# Patient Record
Sex: FEMALE | URBAN - METROPOLITAN AREA
[De-identification: names, ages, dates, MRNs, and addresses within clinical notes are randomized per-mention and may not be internally consistent; named-entity substitution may affect disease eponyms.]

---

## 2022-10-12 ENCOUNTER — APPOINTMENT (RX ONLY)
Dept: URBAN - METROPOLITAN AREA CLINIC 59 | Facility: CLINIC | Age: 47
Setting detail: DERMATOLOGY
End: 2022-10-12

## 2022-10-12 DIAGNOSIS — L40.0 PSORIASIS VULGARIS: ICD-10-CM

## 2022-10-12 PROCEDURE — ? PRESCRIPTION

## 2022-10-12 PROCEDURE — 99203 OFFICE O/P NEW LOW 30 MIN: CPT

## 2022-10-12 PROCEDURE — ? COUNSELING

## 2022-10-12 PROCEDURE — ? TREATMENT REGIMEN

## 2022-10-12 RX ORDER — CLOBETASOL PROPIONATE 0.5 MG/G
CREAM TOPICAL BID
Qty: 60 | Refills: 0 | Status: ERX | COMMUNITY
Start: 2022-10-12

## 2022-10-12 RX ORDER — CALCIPOTRIENE 0.05 MG/G
CREAM TOPICAL
Qty: 120 | Refills: 0 | Status: ERX

## 2022-10-12 RX ORDER — CLOBETASOL PROPIONATE 0.5 MG/G
CREAM TOPICAL BID
Qty: 60 | Refills: 0 | Status: ERX

## 2022-10-12 RX ORDER — CALCIPOTRIENE 0.05 MG/G
CREAM TOPICAL
Qty: 120 | Refills: 0 | Status: ERX | COMMUNITY
Start: 2022-10-12

## 2022-10-12 RX ADMIN — CALCIPOTRIENE: 0.05 CREAM TOPICAL at 00:00

## 2022-10-12 RX ADMIN — CLOBETASOL PROPIONATE: 0.5 CREAM TOPICAL at 00:00

## 2022-10-12 ASSESSMENT — BSA PSORIASIS: % BODY COVERED IN PSORIASIS: 20

## 2022-10-12 NOTE — PROCEDURE: TREATMENT REGIMEN
Action 1: Start
Action 3: Continue
Treatment 1: Clobetasol cream
Treatment 2: Calcipotriene cream
Sig For Treatment 1 (If Needed): twice daily
Detail Level: Zone

## 2022-10-24 ENCOUNTER — APPOINTMENT (RX ONLY)
Dept: URBAN - METROPOLITAN AREA CLINIC 59 | Facility: CLINIC | Age: 47
Setting detail: DERMATOLOGY
End: 2022-10-24

## 2022-10-24 ENCOUNTER — RX ONLY (OUTPATIENT)
Age: 47
Setting detail: RX ONLY
End: 2022-10-24

## 2022-10-24 DIAGNOSIS — L40.0 PSORIASIS VULGARIS: ICD-10-CM | Status: IMPROVED

## 2022-10-24 PROCEDURE — ? COUNSELING

## 2022-10-24 PROCEDURE — 99213 OFFICE O/P EST LOW 20 MIN: CPT

## 2022-10-24 PROCEDURE — ? TREATMENT REGIMEN

## 2022-10-24 RX ORDER — CALCIPOTRIENE 0.05 MG/G
CREAM TOPICAL
Qty: 120 | Refills: 0 | Status: ERX

## 2022-10-24 RX ORDER — CLOBETASOL PROPIONATE 0.5 MG/G
CREAM TOPICAL BID
Qty: 60 | Refills: 0 | Status: ERX

## 2022-10-24 ASSESSMENT — PGA PSORIASIS: PGA PSORIASIS 2020: MILD

## 2022-10-24 ASSESSMENT — BSA PSORIASIS: % BODY COVERED IN PSORIASIS: 20

## 2022-10-24 NOTE — PROCEDURE: TREATMENT REGIMEN
Action 1: Continue
Detail Level: Zone
Continue Regimen: Calcipotriene 0.005% cream PRN\\nClobetasol 0.05% cream PRN

## 2024-09-09 ENCOUNTER — OFFICE VISIT (OUTPATIENT)
Facility: LOCATION | Age: 49
End: 2024-09-09
Payer: COMMERCIAL

## 2024-09-09 DIAGNOSIS — H25.13 AGE-RELATED NUCLEAR CATARACT, BILATERAL: Primary | ICD-10-CM

## 2024-09-09 PROCEDURE — 99204 OFFICE O/P NEW MOD 45 MIN: CPT | Performed by: STUDENT IN AN ORGANIZED HEALTH CARE EDUCATION/TRAINING PROGRAM

## 2024-09-09 RX ORDER — PREDNISOLONE ACETATE 10 MG/ML
1 % SUSPENSION/ DROPS OPHTHALMIC
Qty: 10 | Refills: 3 | Status: ACTIVE
Start: 2024-09-09

## 2024-09-09 RX ORDER — KETOROLAC TROMETHAMINE 5 MG/ML
0.5 % SOLUTION OPHTHALMIC
Qty: 5 | Refills: 3 | Status: ACTIVE
Start: 2024-09-09

## 2024-09-09 RX ORDER — MOXIFLOXACIN 5 MG/ML
0.5 % SOLUTION/ DROPS OPHTHALMIC
Qty: 3 | Refills: 3 | Status: ACTIVE
Start: 2024-09-09

## 2024-09-09 ASSESSMENT — INTRAOCULAR PRESSURE
OD: 14
OS: 14

## 2024-09-09 ASSESSMENT — VISUAL ACUITY
OS: 20/80
OD: 20/30

## 2024-11-04 ENCOUNTER — PROCEDURE (OUTPATIENT)
Facility: LOCATION | Age: 49
End: 2024-11-04
Payer: COMMERCIAL

## 2024-11-04 ENCOUNTER — Encounter (OUTPATIENT)
Facility: LOCATION | Age: 49
End: 2024-11-04
Payer: COMMERCIAL

## 2024-11-04 PROCEDURE — 66984 XCAPSL CTRC RMVL W/O ECP: CPT | Performed by: STUDENT IN AN ORGANIZED HEALTH CARE EDUCATION/TRAINING PROGRAM

## 2024-11-05 ENCOUNTER — POST-OPERATIVE VISIT (OUTPATIENT)
Facility: LOCATION | Age: 49
End: 2024-11-05
Payer: COMMERCIAL

## 2024-11-05 DIAGNOSIS — Z48.810 ENCOUNTER FOR SURGICAL AFTERCARE FOLLOWING SURGERY ON A SENSE ORGAN: Primary | ICD-10-CM

## 2024-11-05 PROCEDURE — 99024 POSTOP FOLLOW-UP VISIT: CPT | Performed by: OPTOMETRIST

## 2024-11-05 ASSESSMENT — INTRAOCULAR PRESSURE
OS: 24
OD: 18

## 2024-11-15 ENCOUNTER — POST-OPERATIVE VISIT (OUTPATIENT)
Facility: LOCATION | Age: 49
End: 2024-11-15
Payer: COMMERCIAL

## 2024-11-15 DIAGNOSIS — H26.492 OTHER SECONDARY CATARACT, LEFT EYE: ICD-10-CM

## 2024-11-15 DIAGNOSIS — Z48.810 ENCOUNTER FOR SURGICAL AFTERCARE FOLLOWING SURGERY ON A SENSE ORGAN: Primary | ICD-10-CM

## 2024-11-15 DIAGNOSIS — H25.11 AGE-RELATED NUCLEAR CATARACT, RIGHT EYE: ICD-10-CM

## 2024-11-15 PROCEDURE — 99024 POSTOP FOLLOW-UP VISIT: CPT | Performed by: STUDENT IN AN ORGANIZED HEALTH CARE EDUCATION/TRAINING PROGRAM

## 2024-11-15 ASSESSMENT — INTRAOCULAR PRESSURE
OS: 18
OD: 18

## 2024-11-15 ASSESSMENT — VISUAL ACUITY: OS: 20/40
